# Patient Record
Sex: MALE | Race: WHITE
[De-identification: names, ages, dates, MRNs, and addresses within clinical notes are randomized per-mention and may not be internally consistent; named-entity substitution may affect disease eponyms.]

---

## 2019-09-09 ENCOUNTER — HOSPITAL ENCOUNTER (OUTPATIENT)
Dept: HOSPITAL 59 - SUR | Age: 59
Discharge: HOME | End: 2019-09-09
Attending: SURGERY
Payer: COMMERCIAL

## 2019-09-09 DIAGNOSIS — K40.90: Primary | ICD-10-CM

## 2019-09-09 DIAGNOSIS — H81.09: ICD-10-CM

## 2019-09-09 DIAGNOSIS — K76.0: ICD-10-CM

## 2019-09-09 DIAGNOSIS — N40.0: ICD-10-CM

## 2019-09-09 PROCEDURE — 49505 PRP I/HERN INIT REDUC >5 YR: CPT

## 2019-09-09 PROCEDURE — 64425 NJX AA&/STRD II IH NERVES: CPT

## 2019-09-09 PROCEDURE — 76942 ECHO GUIDE FOR BIOPSY: CPT

## 2019-09-09 PROCEDURE — 00830 ANES HERNIA RPR LWR ABD NOS: CPT

## 2019-09-15 ENCOUNTER — HOSPITAL ENCOUNTER (EMERGENCY)
Dept: HOSPITAL 59 - ER | Age: 59
Discharge: ANOTHER HOSPITAL | End: 2019-09-15
Payer: COMMERCIAL

## 2019-09-15 DIAGNOSIS — K80.50: Primary | ICD-10-CM

## 2019-09-15 DIAGNOSIS — R10.11: ICD-10-CM

## 2019-09-15 DIAGNOSIS — R11.2: ICD-10-CM

## 2019-09-15 LAB
ALBUMIN SERPL-MCNC: 4.6 G/DL (ref 4–5)
ALP SERPL-CCNC: 67 U/L (ref 40–129)
ALT SERPL-CCNC: 37 U/L (ref ?–41)
ANION GAP SERPL CALC-SCNC: 13 MMOL/L (ref 7–16)
APPEARANCE UR: CLEAR
AST SERPL-CCNC: 20 U/L (ref 10–50)
BILIRUB DIRECT SERPL-MCNC: < 0.2 MG/DL (ref 0–0.3)
BILIRUB SERPL-MCNC: 0.4 MG/DL (ref 0.2–1)
BILIRUB UR-MCNC: NEGATIVE MG/DL
BUN SERPL-MCNC: 22 MG/DL (ref 8–23)
CO2 SERPL-SCNC: 28 MMOL/L (ref 22–29)
COLOR UR: YELLOW
CREAT SERPL-MCNC: 0.9 MG/DL (ref 0.7–1.2)
ERYTHROCYTE [DISTWIDTH] IN BLOOD BY AUTOMATED COUNT: 13.5 % (ref 11.5–14.5)
EST GLOMERULAR FILTRATION RATE: > 60 ML/MIN
GLUCOSE SERPL-MCNC: 134 MG/DL (ref 74–109)
GLUCOSE UR STRIP-MCNC: NEGATIVE MG/DL
HCT VFR BLD CALC: 45.4 % (ref 42–52)
HGB BLD-MCNC: 15.3 GM/DL (ref 14–18)
KETONES UR QL STRIP: NEGATIVE
LIPASE SERPL-CCNC: 31 U/L (ref 13–60)
LYMPHOCYTES NFR BLD: 4 % (ref 16–45)
MCH RBC QN AUTO: 29.7 PG (ref 27–33)
MCHC RBC AUTO-ENTMCNC: 33.7 G/DL (ref 32–36)
MCV RBC AUTO: 88.2 FL (ref 81–97)
MONOCYTES NFR BLD: 7 % (ref 0–9)
NEUTROPHILS NFR BLD AUTO: 89 % (ref 47–80)
NITRITE UR QL STRIP: NEGATIVE
PLATELET # BLD: 261 K/UL (ref 130–400)
PMV BLD AUTO: 10.9 FL (ref 7.4–10.4)
PROT SERPL-MCNC: 7.5 G/DL (ref 6.6–8.7)
PROT UR QL STRIP: NEGATIVE
RBC # BLD AUTO: 5.15 M/UL (ref 4.4–5.7)
RBC # UR STRIP: NEGATIVE /UL
URINE LEUKOCYTE ESTERASE: NEGATIVE
UROBILINOGEN UR STRIP-ACNC: 0.2 E.U./DL (ref 0.2–1)
WBC # BLD AUTO: 16.4 K/UL (ref 4.2–12.2)

## 2019-09-15 PROCEDURE — 74176 CT ABD & PELVIS W/O CONTRAST: CPT

## 2019-09-15 PROCEDURE — 81003 URINALYSIS AUTO W/O SCOPE: CPT

## 2019-09-15 PROCEDURE — 99285 EMERGENCY DEPT VISIT HI MDM: CPT

## 2019-09-15 PROCEDURE — 83690 ASSAY OF LIPASE: CPT

## 2019-09-15 PROCEDURE — 80076 HEPATIC FUNCTION PANEL: CPT

## 2019-09-15 PROCEDURE — 80048 BASIC METABOLIC PNL TOTAL CA: CPT

## 2019-09-15 PROCEDURE — 85027 COMPLETE CBC AUTOMATED: CPT

## 2019-09-15 NOTE — EMERGENCY DEPARTMENT RECORD
History of Present Illness





- General


Chief Complaint: Abdominal Pain


Stated Complaint: ABDOMINAL PAIN


Time Seen by Provider: 09/15/19 02:49


Source: Patient


Mode of Arrival: Ambulatory


Limitations: No limitations





- History of Present Illness


Initial Comments: 





pt has had severe abd pain which is getting progressively worse in the ruq since

yesterday.  he has n/v but no c/d. pt had  surgery earlier this month on a r 

inguinal hernia.  he has no pain in that area


MD Complaint: Abdominal pain


Onset/Timin


-: Hour(s)


Location: RUQ


Severity scale (1-10): 7


Improves With: Nothing


Worsens With: Nothing


Associated Symptoms: Nausea, Vomiting, Other





- Related Data


                                Home Medications











 Medication  Instructions  Recorded  Confirmed  Last Taken


 


Dutasteride [Avodart] 0.5 mg PO DAILY 09/15/19 09/15/19 09/13/19








                                  Previous Rx's











 Medication  Instructions  Recorded


 


Hydrocodone/Acetaminophen [Norco 1 tab PO Q6H PRN #14 tab 11/06/15





5mg/325mg]  











                                    Allergies











Allergy/AdvReac Type Severity Reaction Status Date / Time


 


No Known Drug Allergies Allergy   Verified 09/15/19 03:05














Travel Screening





- Travel/Exposure Within Last 30 Days


Have you traveled within the last 30 days?: Yes


Location Detail:: [place]





- Travel/Exposure Within Last Year


Have you traveled outside the [place] in the last year?: No





- Additonal Travel Details


Have you been exposed to anyone with a communicable illness?: No





- Travel Symptoms


Symptom Screening: Vomiting





Review of Systems


Reviewed: No additional complaints except as noted below


Constitutional: Reports: As per HPI.  Denies: Chills, Fever, Malaise, Night 

sweats, Weakness, Weight change


Eyes: Reports: As per HPI.  Denies: Eye discharge, Eye pain, Photophobia, Vision

change


ENT: Reports: As per HPI.  Denies: Congestion, Dental pain, Ear pain, Epistaxis,

Hearing loss, Throat pain


Respiratory: Reports: As per HPI.  Denies: Cough, Dyspnea, Hemoptysis, Stridor, 

Wheezes


Cardiovascular: Reports: As per HPI.  Denies: Arrhythmia, Chest pain, Dyspnea on

exertion, Edema, Murmurs, Orthopnea, Palpitations, Paroxysmal nocturnal dyspnea,

Rheumatic Fever, Syncope


Endocrine: Reports: As per HPI.  Denies: Fatigue, Heat or cold intolerance, 

Polydipsia, Polyuria


Gastrointestinal: Reports: As per HPI.  Denies: Abdominal pain, Constipation, 

Diarrhea, Hematemesis, Hematochezia, Melena, Nausea, Vomiting


Genitourinary: Reports: As per HPI.  Denies: Dysuria, Frequency, Hematuria, 

Incontinence, Retention, Testicular pain, Testicular mass, Urgency


Musculoskeletal: Reports: As per HPI.  Denies: Arthralgia, Back pain, Gout, 

Joint swelling, Myalgia, Neck pain


Skin: Reports: As per HPI.  Denies: Bruising, Change in color, Change in hair

/nails, Lesions, Pruritus, Rash


Neurological: Reports: As per HPI.  Denies: Abnormal gait, Confusion, Headache, 

Numbness, Paresthesias, Seizure, Tingling, Tremors, Vertigo, Weakness


Psychiatric: Reports: As per HPI.  Denies: Anxiety, Auditory hallucinations, 

Depression, Homicidal thoughts, Suicidal thoughts, Visual hallucinations


Hematological/Lymphatic: Reports: As per HPI.  Denies: Anemia, Blood Clots, Easy

bleeding, Easy bruising, Swollen glands





Past Medical History





- SOCIAL HISTORY


Smoking Status: Never smoker


Alcohol Use: None


Drug Use: None





- RESPIRATORY


Hx Respiratory Disorders: Yes


Hx Pneumonia: Yes (MANY YEARS AGO)





- CARDIOVASCULAR


Hx Cardio Disorders: Yes


Comment:: AORTIC ANEURYSM VERY SMALL





- NEURO


Hx Neuro Disorders: Yes


Hx Dizziness: Yes (MENIERES CONTROLLED WITH DYAZIDE)





- GI


Hx GI Disorders: Yes


Hx Liver Disease: Yes (FATTY LIVER DISEASE)





- 


Hx Genitourinary Disorders: Yes


Hx Prostate Problems: Yes (BPH ON MEDS)





- ENDOCRINE


Hx Endocrine Disorders: No





- MUSCULOSKELETAL


Hx Musculoskeletal Disorders: No





- PSYCH


Hx Psych Problems: No





- HEMATOLOGY/ONCOLOGY


Hx Hematology/Oncology Disorders: No





Family Medical History


Any Significant Family History?: No


Family Hx Comment (NOT TO BE USED IN PLACE OF ITEMS BELOW): AAA, stroke





Physical Exam





- General


General Appearance: Alert, Oriented x3, Cooperative, Mild distress





- Head


Head exam: Normal inspection





- Eye


Eye exam: Normal appearance, PERRL, EOMI


Pupils: Normal accommodation





- ENT


ENT exam: Normal exam, Mucous membranes moist, Normal external ear exam, Normal 

orophraynx


Ear exam: Normal external inspection.  negative: External canal tenderness


Nasal Exam: Normal inspection.  negative: Discharge, Sinus tenderness


Mouth exam: Normal external inspection, Tongue normal


Teeth exam: Normal inspection.  negative: Dental caries


Throat exam: Normal inspection.  negative: Tonsillar erythema, Tonsillar exudate





- Neck


Neck exam: Normal inspection, Full ROM.  negative: Tenderness





- Respiratory


Respiratory exam: Normal lung sounds bilaterally.  negative: Respiratory 

distress





- Cardiovascular


Cardiovascular Exam: Normal rhythm, Normal heart sounds, Bradycardia





- GI/Abdominal


GI/Abdominal exam: Soft, Normal bowel sounds, Tenderness (ruq.  pos murphys.)





- Rectal


Rectal exam: Deferred





- 


 exam: Deferred





- Extremities


Extremities exam: Normal inspection, Full ROM, Normal capillary refill.  

negative: Tenderness





- Back


Back exam: Reports: Normal inspection, Full ROM.  Denies: Muscle spasm, Rash 

noted, Tenderness





- Neurological


Neurological exam: Alert, CN II-XII intact, Normal gait, Oriented X3





- Psychiatric


Psychiatric exam: Normal affect, Normal mood





- Skin


Skin exam: Dry, Intact, Normal color, Warm





Course





                                   Vital Signs











  09/15/19 09/15/19





  02:46 03:38


 


Temperature 97.6 F 


 


Pulse Rate [ 53 L 62





Pulse Ox Probe]  


 


Respiratory 20 16





Rate  


 


Blood Pressure 147/97 134/77





[Left Arm]  


 


Pulse Ox 100 98














- Reevaluation(s)


Reevaluation #1: 





09/15/19 04:04


ct is neg. pts pain has improved but is still significant. pt has hi wbc, pos 

murphys, therefore i am transferring pt to [facility] for us of gb and to see dr gilbert if necessary





Medical Decision Making





- Lab Data


Result diagrams: 


                                 09/15/19 03:05





                                 09/15/19 03:05





                                   Lab Results











  09/15/19 09/15/19 09/15/19 Range/Units





  02:48 03:05 03:05 


 


WBC   16.4 H   (4.2-12.2)  K/uL


 


RBC   5.15   (4.40-5.70)  M/uL


 


Hgb   15.3   (14.0-18.0)  gm/dl


 


Hct   45.4   (42.0-52.0)  %


 


MCV   88.2   (81-97)  fl


 


MCH   29.7   (27-33)  pg


 


MCHC   33.7   (32-36)  g/dl


 


RDW   13.5   (11.5-14.5)  %


 


Plt Count   261   (130-400)  K/uL


 


MPV   10.9 H   (7.4-10.4)  fl


 


Neutrophils %   89.0 H   (47-80)  %


 


Eosinophils %   Not Reportable   


 


Basophils %   Not Reportable   


 


Absolute Neutrophils   Not Reportable   


 


Lymphocytes   4.0 L   (16-45)  %


 


Monocytes   7.0   (0-9)  %


 


Sodium    142  (136-145)  mmol/L


 


Potassium    4.1  (3.4-4.5)  mmol/L


 


Chloride    101  ()  mmol/L


 


Carbon Dioxide    28.0  (22-29)  mmol/L


 


Anion Gap    13.0  (7-16)  


 


BUN    22  (8-23)  mg/dL


 


Creatinine    0.9  (0.7-1.2)  mg/dL


 


Estimated GFR    > 60  mL/min


 


Random Glucose    134 H  ()  mg/dL


 


Calcium    9.3  (8.8-10.2)  mg/dL


 


Total Bilirubin    0.40  (0.2-1.0)  mg/dL


 


Direct Bilirubin    < 0.2  (0-0.3)  mg/dL


 


AST    20  (10.0-50.0)  U/L


 


ALT    37  (<41)  U/L


 


Alkaline Phosphatase    67  ()  U/L


 


Total Protein    7.5  (6.6-8.7)  g/dL


 


Albumin    4.6  (4.0-5.0)  g/dL


 


Lipase    31  (13-60)  U/L


 


Urine Color  Yellow    


 


Urine Appearance  Clear    


 


Urine pH  6.0    (5.0-8.0)  


 


Ur Specific Gravity  >= 1.030    (1.002-1.030)  


 


Urine Protein  Negative    (NEGATIVE)  


 


Urine Glucose (UA)  Negative    (NEGATIVE)  


 


Urine Ketones  Negative    (NEGATIVE)  


 


Urine Blood  Negative    (NEGATIVE)  


 


Urine Nitrite  Negative    (NEGATIVE)  


 


Urine Bilirubin  Negative    (NEGATIVE)  


 


Urine Urobilinogen  0.2    (0.20 - 1.00)  E.U./dL


 


Ur Leukocyte Esterase  Negative    (NEGATIVE)  














Disposition


Disposition: Transfer


Clinical Impression: 


 Biliary colic





Disposition: Acute [facility] Transfer


Transfer To: [facility]


Reason For Transfer: needs us and possibly dr gilbert


Accepting Physician: dr richards


Time Discussed w/Accepting Physician: 04:07





Quality





- Quality Measures


Quality Measures: N/A





- Blood Pressure Screening


Does Patient Have Any of the Following: No


Blood Pressure Classification: Pre-Hypertensive BP Reading


Systolic Measurement: 134


Diastolic Measurement: 77


Screening for High Blood Pressure: < Pre-Hypertensive BP, F/U Documented > 

[]


Pre-Hypertensive Follow-up Interventions: Follow-up with rescreen every year.

## 2019-09-16 NOTE — CT SCAN REPORT
EXAM:  CT OF THE ABDOMEN AND PELVIS



HISTORY:  NAUSEA AND VOMITING.  ABDOMINAL PAIN.  HIATAL HERNIA REPAIR 9/09/19.  



TECHNIQUE:  CT of the abdomen and pelvis was performed without intravenous 
contrast.  



Comparison:  11/05/15.  



FINDINGS:  There is a calcified nodule at the right lung base likely an old 
granuloma.  The lung bases are otherwise unremarkable.  



There are three hypodense liver lesions.  A 4 mm lesion is seen in the superior 
aspect of the left lobe of the liver.  This was not well visualized on the 
previous study possibly due to its small size.  A 5 mm lesion is seen in the 
left lobe on image 58 of 190 and a 5 mm lesion in the inferior aspect of the 
right lobe on image 85 of 190.  These were present previously.  These were not 
optimally assessed due to their small size and lack of intravenous contrast.  
These may represent cysts.  Other small liver lesions may also be present.  



Punctate calcification of the spleen is likely due to old granulomatous disease.
 



There is a small hiatal hernia.  



No pancreatic mass or inflammatory changes.  There are no calcified gallstones. 
The bile ducts are not dilated.  



No adrenal mass.  There are no renal or ureteral calculi and there is no 
hydronephrosis.  



There is no aortic aneurysm.  No periaortic mass or adenopathy.  



The appendix is unremarkable.  There are no dilated bowel loops.  



There are inflammatory changes in the right inguinal region.  This may be 
related to recent surgery.  Please correlate clinically.  There is gas in the 
soft tissues also which may be postoperative.  There is no focal fluid 
collection identified.  



No pelvic mass, abscess, or adenopathy.  There is no free air or free fluid 
identified.  



There is no lytic or blastic bone lesion.  There are some nonspecific sclerotic 
changes in the upper right ileum which are similar to the previous examination. 




IMPRESSION:  

1.   INFLAMMATORY CHANGES AND SOFT TISSUE GAS IDENTIFIED IN THE RIGHT INGUINAL 
REGION LIKELY THE RESULT OF RECENT SURGERY.  PLEASE CORRELATE CLINICALLY.  NO 
ABSCESS OR FOCAL FLUID COLLECTION.  



2.  AT LEAST THREE TINY HYPODENSE LIVER LESIONS ARE PRESENT LIKELY REPRESENTING 
CYSTS, BUT TOO SMALL TO DEFINITIVELY ASSESS.  



3.  SMALL HIATAL HERNIA.  



4.  SEE ABOVE FOR FULL DISCUSSION.  



JOB NUMBER:  [id number]

[facility]